# Patient Record
Sex: FEMALE
[De-identification: names, ages, dates, MRNs, and addresses within clinical notes are randomized per-mention and may not be internally consistent; named-entity substitution may affect disease eponyms.]

---

## 2021-02-08 ENCOUNTER — APPOINTMENT (OUTPATIENT)
Dept: UROLOGY | Facility: CLINIC | Age: 71
End: 2021-02-08

## 2021-02-17 ENCOUNTER — APPOINTMENT (OUTPATIENT)
Dept: UROGYNECOLOGY | Facility: CLINIC | Age: 71
End: 2021-02-17
Payer: MEDICARE

## 2021-02-17 VITALS — WEIGHT: 190 LBS | HEIGHT: 66 IN | BODY MASS INDEX: 30.53 KG/M2 | TEMPERATURE: 96.9 F

## 2021-02-17 LAB
BILIRUB UR QL STRIP: NORMAL
CLARITY UR: CLEAR
COLLECTION METHOD: NORMAL
GLUCOSE UR-MCNC: NORMAL
HCG UR QL: 0.2 EU/DL
HGB UR QL STRIP.AUTO: NORMAL
KETONES UR-MCNC: NORMAL
LEUKOCYTE ESTERASE UR QL STRIP: NORMAL
NITRITE UR QL STRIP: NORMAL
PH UR STRIP: 7.5
PROT UR STRIP-MCNC: NORMAL
SP GR UR STRIP: 1.01

## 2021-02-17 PROCEDURE — 99072 ADDL SUPL MATRL&STAF TM PHE: CPT

## 2021-02-17 PROCEDURE — 51701 INSERT BLADDER CATHETER: CPT

## 2021-02-17 PROCEDURE — 99204 OFFICE O/P NEW MOD 45 MIN: CPT | Mod: 25

## 2021-02-17 NOTE — DISCUSSION/SUMMARY
[FreeTextEntry1] : Taina presents with microscopic hematuria and stress urinary incontinence. Urine sent for micro UA and culture today. We reviewed the recommended workup for microhematuria which includes: renal function testing, cystoscopy and upper tract imaging with CT Urogram. A prescription for CT Urogram was provided and she will return to my office for cystoscopy. A patient handout on  workup was also provided to her to review. \par \par We reviewed management options for stress urinary incontinence. I recommend completing the above workup prior to treating her EDWARD. [facility] written information on EDWARD treatment options was provided to her to review. We will discuss this further once  workup complete. \par \par

## 2021-02-17 NOTE — PHYSICAL EXAM
[Chaperone Present] : A chaperone was present in the examining room during all aspects of the physical examination [No Acute Distress] : in no acute distress [Oriented x3] : oriented to person, place, and time [None] : no CVA tenderness [Warm and Dry] : was warm and dry to touch [Normal Gait] : gait was normal [Vulvar Atrophy] : vulvar atrophy [Labia Majora] : were normal [Labia Minora] : were normal [Normal Appearance] : general appearance was normal [Atrophy] : atrophy [Normal] : no abnormalities [FreeTextEntry1] : . [Tenderness] : ~T no ~M abdominal tenderness observed [Distended] : not distended [Inguinal LAD] : no adenopathy was noted in the inguinal lymph nodes

## 2021-02-17 NOTE — HISTORY OF PRESENT ILLNESS
[Unable To Restrain Bowel Movement] : mild [] : years ago [Urinary Frequency] : no [Urinary Tract Infection] : no [FreeTextEntry1] : \starr Her presents with a h/o microscopic hematuria confirmed on microscopic UA. She was previously seen in my office in 2015. At that time we confirmed microhematuria with 5 RBC/HPF and negative urine culture. I recommended workup with cystoscopy and upper tract imaging however she cancelled her appointment and did not get CT as recommended and ordered. Today she reports she did not undergo workup because she didn’t believe it was necessary. She continues to have microhematuria on micro UA with her PCP and is ready to undergo workup. \par \par She denies a history of renal calculi, family history of urinary tract cancer, tobacco use or exposures. \par \par \par She reports mild symptoms of stress urinary incontinence however this is more bothersome to her then in 2015.

## 2021-02-17 NOTE — REASON FOR VISIT
[Questionnaire Received] : Patient questionnaire received [Intake Form Reviewed] : Patient intake form with past medical history, surgical history, family history and social history reviewed today [Blood In Urine] : blood in urine

## 2021-02-17 NOTE — PROCEDURE
[FreeTextEntry1] : sterile straight cathterization performed to obtain a sterile urine specimen for testing and to measure a PVR which was 30 cc

## 2021-02-19 LAB — BACTERIA UR CULT: NORMAL

## 2021-02-22 LAB
APPEARANCE: CLEAR
BACTERIA: NEGATIVE
BILIRUBIN URINE: NEGATIVE
BLOOD URINE: NEGATIVE
COLOR: NORMAL
GLUCOSE QUALITATIVE U: NEGATIVE
HYALINE CASTS: 1 /LPF
KETONES URINE: NEGATIVE
LEUKOCYTE ESTERASE URINE: NEGATIVE
MICROSCOPIC-UA: NORMAL
NITRITE URINE: NEGATIVE
PH URINE: 7.5
PROTEIN URINE: NORMAL
RED BLOOD CELLS URINE: 6 /HPF
SPECIFIC GRAVITY URINE: 1.02
SQUAMOUS EPITHELIAL CELLS: 1 /HPF
UROBILINOGEN URINE: NORMAL
WHITE BLOOD CELLS URINE: 0 /HPF

## 2021-04-21 ENCOUNTER — APPOINTMENT (OUTPATIENT)
Dept: UROGYNECOLOGY | Facility: CLINIC | Age: 71
End: 2021-04-21
Payer: MEDICARE

## 2021-04-21 ENCOUNTER — OUTPATIENT (OUTPATIENT)
Dept: OUTPATIENT SERVICES | Facility: HOSPITAL | Age: 71
LOS: 1 days | End: 2021-04-21
Payer: MEDICARE

## 2021-04-21 DIAGNOSIS — R39.15 URGENCY OF URINATION: ICD-10-CM

## 2021-04-21 DIAGNOSIS — R31.29 OTHER MICROSCOPIC HEMATURIA: ICD-10-CM

## 2021-04-21 DIAGNOSIS — N39.41 URGE INCONTINENCE: ICD-10-CM

## 2021-04-21 DIAGNOSIS — Z01.818 ENCOUNTER FOR OTHER PREPROCEDURAL EXAMINATION: ICD-10-CM

## 2021-04-21 DIAGNOSIS — N39.3 STRESS INCONTINENCE (FEMALE) (MALE): ICD-10-CM

## 2021-04-21 LAB
BILIRUB UR QL STRIP: NORMAL
CLARITY UR: CLEAR
COLLECTION METHOD: NORMAL
GLUCOSE UR-MCNC: NORMAL
HCG UR QL: 0.2 EU/DL
HGB UR QL STRIP.AUTO: NORMAL
KETONES UR-MCNC: NORMAL
LEUKOCYTE ESTERASE UR QL STRIP: NORMAL
NITRITE UR QL STRIP: NORMAL
PH UR STRIP: 5
PROT UR STRIP-MCNC: NORMAL
SP GR UR STRIP: 1.03

## 2021-04-21 PROCEDURE — 52000 CYSTOURETHROSCOPY: CPT

## 2021-04-21 PROCEDURE — 99213 OFFICE O/P EST LOW 20 MIN: CPT | Mod: 25

## 2021-04-21 RX ORDER — OXYBUTYNIN CHLORIDE 10 MG/1
10 TABLET, EXTENDED RELEASE ORAL
Qty: 30 | Refills: 5 | Status: ACTIVE | COMMUNITY
Start: 2021-04-21 | End: 1900-01-01

## 2021-04-21 NOTE — DISCUSSION/SUMMARY
[FreeTextEntry1] : -Cystoscopy negative, she agrees to schedule CT Urogram. She was counseled on importance and rationale of upper tract imaging. She has Rx and expressed understanding \par -Urodynamic testing\par -Bladder training\par -Start trial of Oxybutynin ER 10 mg daily. Side effects and risks reviewed\par -RTO for URD and follow up to discuss results and management options further. \par

## 2021-04-21 NOTE — HISTORY OF PRESENT ILLNESS
[Unable To Restrain Bowel Movement] : moderate [Urinary Frequency] : no [Feelings Of Urinary Urgency] : moderate [Urinary Tract Infection] : moderate [] : months ago [de-identified] : bothersome [FreeTextEntry1] : \starr Her presents for follow up with a h/o microscopic hematuria and urinary incontinence. She underwent cystoscopy today which is negative. See procedure note. Today she reports frequency, urgency and UUI. She also has EDWARD which is bothersome.

## 2021-05-19 ENCOUNTER — NON-APPOINTMENT (OUTPATIENT)
Age: 71
End: 2021-05-19